# Patient Record
Sex: FEMALE | Race: WHITE | Employment: UNEMPLOYED | URBAN - METROPOLITAN AREA
[De-identification: names, ages, dates, MRNs, and addresses within clinical notes are randomized per-mention and may not be internally consistent; named-entity substitution may affect disease eponyms.]

---

## 2019-06-08 ENCOUNTER — HOSPITAL ENCOUNTER (EMERGENCY)
Age: 19
Discharge: NURSING, REHAB OR LONG-TERM CARE | End: 2019-06-08
Attending: EMERGENCY MEDICINE
Payer: MEDICAID

## 2019-06-08 VITALS
SYSTOLIC BLOOD PRESSURE: 115 MMHG | OXYGEN SATURATION: 99 % | RESPIRATION RATE: 18 BRPM | BODY MASS INDEX: 24.84 KG/M2 | HEART RATE: 95 BPM | TEMPERATURE: 98.9 F | WEIGHT: 135 LBS | HEIGHT: 62 IN | DIASTOLIC BLOOD PRESSURE: 69 MMHG

## 2019-06-08 DIAGNOSIS — F39 MOOD DISORDER (HCC): Primary | ICD-10-CM

## 2019-06-08 LAB
ACETAMINOPHEN LEVEL: <5 MCG/ML (ref 10–30)
ALBUMIN SERPL-MCNC: 3.8 G/DL (ref 3.5–5.2)
ALP BLD-CCNC: 98 U/L (ref 35–104)
ALT SERPL-CCNC: 26 U/L (ref 0–32)
AMPHETAMINE SCREEN, URINE: NOT DETECTED
ANION GAP SERPL CALCULATED.3IONS-SCNC: 5 MMOL/L (ref 7–16)
AST SERPL-CCNC: 24 U/L (ref 0–31)
BARBITURATE SCREEN URINE: NOT DETECTED
BASOPHILS ABSOLUTE: 0.07 E9/L (ref 0–0.2)
BASOPHILS RELATIVE PERCENT: 0.6 % (ref 0–2)
BENZODIAZEPINE SCREEN, URINE: NOT DETECTED
BILIRUB SERPL-MCNC: <0.2 MG/DL (ref 0–1.2)
BILIRUBIN URINE: NEGATIVE
BLOOD, URINE: NEGATIVE
BUN BLDV-MCNC: 14 MG/DL (ref 6–20)
CALCIUM SERPL-MCNC: 9.1 MG/DL (ref 8.6–10.2)
CANNABINOID SCREEN URINE: POSITIVE
CHLORIDE BLD-SCNC: 107 MMOL/L (ref 98–107)
CLARITY: CLEAR
CO2: 28 MMOL/L (ref 22–29)
COCAINE METABOLITE SCREEN URINE: NOT DETECTED
COLOR: YELLOW
CREAT SERPL-MCNC: 0.9 MG/DL (ref 0.5–1)
EOSINOPHILS ABSOLUTE: 0.21 E9/L (ref 0.05–0.5)
EOSINOPHILS RELATIVE PERCENT: 1.8 % (ref 0–6)
ETHANOL: <10 MG/DL (ref 0–0.08)
GFR AFRICAN AMERICAN: >60
GFR NON-AFRICAN AMERICAN: >60 ML/MIN/1.73
GLUCOSE BLD-MCNC: 94 MG/DL (ref 74–99)
GLUCOSE URINE: NEGATIVE MG/DL
HCG, URINE, POC: NEGATIVE
HCT VFR BLD CALC: 38.1 % (ref 34–48)
HEMOGLOBIN: 12.6 G/DL (ref 11.5–15.5)
IMMATURE GRANULOCYTES #: 0.03 E9/L
IMMATURE GRANULOCYTES %: 0.3 % (ref 0–5)
KETONES, URINE: NEGATIVE MG/DL
LEUKOCYTE ESTERASE, URINE: NEGATIVE
LYMPHOCYTES ABSOLUTE: 3.23 E9/L (ref 1.5–4)
LYMPHOCYTES RELATIVE PERCENT: 28.2 % (ref 20–42)
Lab: NORMAL
MCH RBC QN AUTO: 28 PG (ref 26–35)
MCHC RBC AUTO-ENTMCNC: 33.1 % (ref 32–34.5)
MCV RBC AUTO: 84.7 FL (ref 80–99.9)
METHADONE SCREEN, URINE: NOT DETECTED
MONOCYTES ABSOLUTE: 0.88 E9/L (ref 0.1–0.95)
MONOCYTES RELATIVE PERCENT: 7.7 % (ref 2–12)
NEGATIVE QC PASS/FAIL: NORMAL
NEUTROPHILS ABSOLUTE: 7.03 E9/L (ref 1.8–7.3)
NEUTROPHILS RELATIVE PERCENT: 61.4 % (ref 43–80)
NITRITE, URINE: NEGATIVE
OPIATE SCREEN URINE: NOT DETECTED
PDW BLD-RTO: 12.8 FL (ref 11.5–15)
PH UA: 6.5 (ref 5–9)
PHENCYCLIDINE SCREEN URINE: NOT DETECTED
PLATELET # BLD: 269 E9/L (ref 130–450)
PMV BLD AUTO: 9.7 FL (ref 7–12)
POSITIVE QC PASS/FAIL: NORMAL
POTASSIUM SERPL-SCNC: 4.1 MMOL/L (ref 3.5–5)
PROPOXYPHENE SCREEN: NOT DETECTED
PROTEIN UA: NEGATIVE MG/DL
RBC # BLD: 4.5 E12/L (ref 3.5–5.5)
SALICYLATE, SERUM: <0.3 MG/DL (ref 0–30)
SODIUM BLD-SCNC: 140 MMOL/L (ref 132–146)
SPECIFIC GRAVITY UA: 1.01 (ref 1–1.03)
TOTAL PROTEIN: 6.6 G/DL (ref 6.4–8.3)
TRICYCLIC ANTIDEPRESSANTS SCREEN SERUM: NEGATIVE NG/ML
UROBILINOGEN, URINE: 0.2 E.U./DL
WBC # BLD: 11.5 E9/L (ref 4.5–11.5)

## 2019-06-08 PROCEDURE — 80307 DRUG TEST PRSMV CHEM ANLYZR: CPT

## 2019-06-08 PROCEDURE — 80053 COMPREHEN METABOLIC PANEL: CPT

## 2019-06-08 PROCEDURE — 99283 EMERGENCY DEPT VISIT LOW MDM: CPT

## 2019-06-08 PROCEDURE — G0480 DRUG TEST DEF 1-7 CLASSES: HCPCS

## 2019-06-08 PROCEDURE — 81003 URINALYSIS AUTO W/O SCOPE: CPT

## 2019-06-08 PROCEDURE — 85025 COMPLETE CBC W/AUTO DIFF WBC: CPT

## 2019-06-08 RX ORDER — HYDROXYZINE PAMOATE 50 MG/1
50 CAPSULE ORAL 4 TIMES DAILY PRN
COMMUNITY

## 2019-06-08 RX ORDER — VENLAFAXINE HYDROCHLORIDE 37.5 MG/1
37.5 CAPSULE, EXTENDED RELEASE ORAL DAILY
COMMUNITY

## 2019-06-08 RX ORDER — LAMOTRIGINE 25 MG/1
25 TABLET ORAL DAILY
COMMUNITY

## 2019-06-08 RX ORDER — ACETAMINOPHEN 500 MG
1000 TABLET ORAL 2 TIMES DAILY PRN
COMMUNITY

## 2019-06-08 RX ORDER — FLUTICASONE PROPIONATE 50 MCG
1 SPRAY, SUSPENSION (ML) NASAL DAILY
COMMUNITY
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
## 2019-06-08 NOTE — ED PROVIDER NOTES
ED Attending  CC: Divya         Department of Emergency Medicine   ED  Provider Note  Admit Date/RoomTime: 6/8/2019  2:36 PM  ED Room: [address] [place] [address]  MRN: [id number]  Chief Complaint: Psychiatric Evaluation (from Meenakshi Woody for [facility] states she is feeling better now states they did not give her one of her antipsychotic medications)       History of Present Illness   Source of history provided by:  patient. History/Exam Limitations: none. Kathie Ledesma is a 22 y.o. female who has a past medical history of: History reviewed. No pertinent past medical history. presents to the ED [address] [place] [address] for complaint of suicidal ideation. She is recovering from alcohol and cocaine addiction. She denies SI/HI at this time. She reports that the facility for the past 5 days has not been giving her Seroquel. They claim that they are unable to give any medication that could be addicting. She is requesting a note that states that she is to receive the Seroquel despite their rules. She reports having auditory and visual hallucinations when she is not taking her Seroquel. States that she hears her name being called and sees shadows. Denies chest pain, shortness of breath, palpitations, numbness, tingling, fever, chills, abdominal pain, back pain, dysuria, urinary frequency, headache, lightheadedness, dizziness, or syncope. She reports that she has no intention of harming herself, but \"freaked out\" on the staff there because she wanted to get her Seroquel. ROS    Pertinent positives and negatives are stated within HPI, all other systems reviewed and are negative. History reviewed. No pertinent surgical history. Social History:  reports that she has been smoking cigarettes. She has been smoking about 1.00 pack per day. She does not have any smokeless tobacco history on file. She reports that she drank alcohol. She reports that she has current or past drug history.   Family History: family history is not on file.  Allergies: Patient has no known allergies. Physical Exam   Oxygen Saturation Interpretation: Normal.   ED Triage Vitals [06/08/19 1437]   BP Temp Temp Source Pulse Resp SpO2 Height Weight   115/69 98.9 °F (37.2 °C) Oral 95 18 99 % 5' 2\" (1.575 m) 135 lb (61.2 kg)       Physical Exam  · Constitutional/General: Alert and oriented x3, well appearing, non toxic  · HEENT:  NC/NT. PERRLA,  Airway patent. · Neck: Supple, full ROM, non tender to palpation in the midline, no stridor, no crepitus, no meningeal signs  · Respiratory: Lungs clear to auscultation bilaterally, no wheezes, rales, or rhonchi. Not in respiratory distress  · CV:  Regular rate. Regular rhythm. No murmurs. 2+ distal pulses  · Chest: No chest wall tenderness  · GI:  Abdomen Soft, Non tender, Non distended. +BS. No rebound, guarding, or rigidity. No pulsatile masses. · Musculoskeletal: Moves all extremities x 4. Warm and well perfused, no clubbing, cyanosis, or edema. Capillary refill <3 seconds  · Integument: skin warm and dry. No rashes. · Lymphatic: no lymphadenopathy noted  · Neurologic: GCS 15, no focal deficits, symmetric strength 5/5 in the upper and lower extremities bilaterally  · Psychiatric: Normal Affect.  cooperative    Lab / Imaging Results   (All laboratory and radiology results have been personally reviewed by myself)  Labs:  Results for orders placed or performed during the hospital encounter of 06/08/19   CBC auto differential   Result Value Ref Range    WBC 11.5 4.5 - 11.5 E9/L    RBC 4.50 3.50 - 5.50 E12/L    Hemoglobin 12.6 11.5 - 15.5 g/dL    Hematocrit 38.1 34.0 - 48.0 %    MCV 84.7 80.0 - 99.9 fL    MCH 28.0 26.0 - 35.0 pg    MCHC 33.1 32.0 - 34.5 %    RDW 12.8 11.5 - 15.0 fL    Platelets 048 887 - 542 E9/L    MPV 9.7 7.0 - 12.0 fL    Neutrophils % 61.4 43.0 - 80.0 %    Immature Granulocytes % 0.3 0.0 - 5.0 %    Lymphocytes % 28.2 20.0 - 42.0 %    Monocytes % 7.7 2.0 - 12.0 %    Eosinophils % 1.8 0.0 - 6.0 % Basophils % 0.6 0.0 - 2.0 %    Neutrophils # 7.03 1.80 - 7.30 E9/L    Immature Granulocytes # 0.03 E9/L    Lymphocytes # 3.23 1.50 - 4.00 E9/L    Monocytes # 0.88 0.10 - 0.95 E9/L    Eosinophils # 0.21 0.05 - 0.50 E9/L    Basophils # 0.07 0.00 - 0.20 E9/L   Comprehensive metabolic panel   Result Value Ref Range    Sodium 140 132 - 146 mmol/L    Potassium 4.1 3.5 - 5.0 mmol/L    Chloride 107 98 - 107 mmol/L    CO2 28 22 - 29 mmol/L    Anion Gap 5 (L) 7 - 16 mmol/L    Glucose 94 74 - 99 mg/dL    BUN 14 6 - 20 mg/dL    CREATININE 0.9 0.5 - 1.0 mg/dL    GFR Non-African American >60 >=60 mL/min/1.73    GFR African American >60     Calcium 9.1 8.6 - 10.2 mg/dL    Total Protein 6.6 6.4 - 8.3 g/dL    Alb 3.8 3.5 - 5.2 g/dL    Total Bilirubin <0.2 0.0 - 1.2 mg/dL    Alkaline Phosphatase 98 35 - 104 U/L    ALT 26 0 - 32 U/L    AST 24 0 - 31 U/L   Urine Drug Screen   Result Value Ref Range    Amphetamine Screen, Urine NOT DETECTED Negative <1000 ng/mL    Barbiturate Screen, Ur NOT DETECTED Negative < 200 ng/mL    Benzodiazepine Screen, Urine NOT DETECTED Negative < 200 ng/mL    Cannabinoid Scrn, Ur POSITIVE (A) Negative < 50ng/mL    Cocaine Metabolite Screen, Urine NOT DETECTED Negative < 300 ng/mL    Opiate Scrn, Ur NOT DETECTED Negative < 300ng/mL    PCP Screen, Urine NOT DETECTED Negative < 25 ng/mL    Methadone Screen, Urine NOT DETECTED Negative <300 ng/mL    Propoxyphene Scrn, Ur NOT DETECTED Negative <300 ng/mL   Serum Drug Screen   Result Value Ref Range    Ethanol Lvl <10 mg/dL    Acetaminophen Level <5.0 (L) 10.0 - 92.8 mcg/mL    Salicylate, Serum <6.2 0.0 - 30.0 mg/dL    TCA Scrn NEGATIVE Cutoff:300 ng/mL   Urinalysis   Result Value Ref Range    Color, UA Yellow Straw/Yellow    Clarity, UA Clear Clear    Glucose, Ur Negative Negative mg/dL    Bilirubin Urine Negative Negative    Ketones, Urine Negative Negative mg/dL    Specific Gravity, UA 1.015 1.005 - 1.030    Blood, Urine Negative Negative    pH, UA 6.5 5.0 - 9.0    Protein, UA Negative Negative mg/dL    Urobilinogen, Urine 0.2 <2.0 E.U./dL    Nitrite, Urine Negative Negative    Leukocyte Esterase, Urine Negative Negative   POC Pregnancy Urine Qual   Result Value Ref Range    HCG, Urine, POC Negative Negative    Lot Number [id number]     Positive QC Pass/Fail Pass     Negative QC Pass/Fail Pass      Imaging: All Radiology results interpreted by Radiologist unless otherwise noted. No orders to display       ED Course / Medical Decision Making   Medications - No data to display     Re-examination:  6/8/19       Dr Cara Moyer at bedside      Consult(s):   IP CONSULT TO SOCIAL WORK    Procedure(s):   none    MDM:   Rebel Ochoa is a 22 yr old female presents the emergency department for complaint of suicidal ideation. She is presently residing at [facility]. She states for the past 5 days since she arrived there they have not been giving her Seroquel. She denies SI/HI on arrival. Pt admits that she told the staff at [facility] that she was going to cut her throat because she wanted them to give her the seroquel, but states that she had no intention of harming or killing herself She is cooperative and was seen by  and deemed safe to return to [facility] rehabilitation. CBC no leukocytosis stable H&H 12.6/38.1, CMP wnl, serum drug screen negative. UDS positive for cannabinoid, UHcg negative. Counseling: The emergency provider has spoken with the patient and discussed todays results, in addition to providing specific details for the plan of care and counseling regarding the diagnosis and prognosis. Questions are answered at this time and they are agreeable with the plan. At this time the patient is without objective evidence of an acute process requiring hospitalization or inpatient management.   They have remained hemodynamically stable throughout their entire ED visit and are stable for discharge with outpatient follow-up. The plan has been discussed in detail and they are aware of the specific conditions for emergent return, as well as the importance of follow-up. Assessment      1. Mood disorder [facility])      Plan   Discharge to [facility]  Patient condition is good    New Medications     There are no discharge medications for this patient. Electronically signed by BRYSON Lin CNP   DD: 6/8/19  **This report was transcribed using voice recognition software. Every effort was made to ensure accuracy; however, inadvertent computerized transcription errors may be present.   END OF ED PROVIDER NOTE     BRYSON Lin CNP  06/08/19 5588

## 2019-06-08 NOTE — ED NOTES
Pt adamantly denies si deneis hi deneis hallucinations states \"i'm feeling better now\" reports that they have not prescribed her Seroquel  \"I still take my other meds but my Seroquel was stopped it has been about 5 days now.   Per Pts med list provided by pt is on Effexor, Lamictal, Vistaril and charted in med rec     [facility] Duty, [address] [facility]  06/08/19 0154

## 2019-06-08 NOTE — ED NOTES
Pt agreeable to discharge and contracts for safety.  Notified Cristiangerardo Sonia @ [phone] concerning pt to be discharged back to them stated will send taxi requested pt stay in ed until taxi arrives then transferred me to give n-n however answering machine recieved     Richardean Aase, RN  06/08/19 1502

## 2019-06-14 LAB — CANNABINOIDS CONF, URINE: 28 NG/ML
